# Patient Record
Sex: MALE | Race: WHITE | NOT HISPANIC OR LATINO
[De-identification: names, ages, dates, MRNs, and addresses within clinical notes are randomized per-mention and may not be internally consistent; named-entity substitution may affect disease eponyms.]

---
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
## 2022-01-03 ENCOUNTER — NON-APPOINTMENT (OUTPATIENT)
Age: 22
End: 2022-01-03

## 2022-01-20 ENCOUNTER — NON-APPOINTMENT (OUTPATIENT)
Age: 22
End: 2022-01-20

## 2022-01-24 NOTE — ASU DISCHARGE PLAN (ADULT/PEDIATRIC) - CALL YOUR DOCTOR IF YOU HAVE ANY OF THE FOLLOWING:
No Nausea and vomiting that does not stop/Inability to tolerate liquids or foods/Increased irritability or sluggishness

## 2022-04-05 DIAGNOSIS — Z20.822 CONTACT WITH AND (SUSPECTED) EXPOSURE TO COVID-19: ICD-10-CM

## 2022-04-11 LAB
COVID-19 SPIKE DOMAIN ANTIBODY INTERPRETATION: POSITIVE
DEPRECATED KAPPA LC FREE/LAMBDA SER: 1.35 RATIO
IGA SER QL IEP: 80 MG/DL
IGG SER QL IEP: 906 MG/DL
IGM SER QL IEP: 47 MG/DL
KAPPA LC CSF-MCNC: 0.68 MG/DL
KAPPA LC SERPL-MCNC: 0.92 MG/DL
SARS-COV-2 AB SERPL IA-ACNC: >250 U/ML

## 2022-04-28 ENCOUNTER — APPOINTMENT (OUTPATIENT)
Dept: ULTRASOUND IMAGING | Facility: HOSPITAL | Age: 22
End: 2022-04-28

## 2022-04-28 ENCOUNTER — OUTPATIENT (OUTPATIENT)
Dept: OUTPATIENT SERVICES | Facility: HOSPITAL | Age: 22
LOS: 1 days | End: 2022-04-28

## 2022-04-28 ENCOUNTER — RESULT REVIEW (OUTPATIENT)
Age: 22
End: 2022-04-28

## 2022-04-28 ENCOUNTER — APPOINTMENT (OUTPATIENT)
Dept: PEDIATRIC NEPHROLOGY | Facility: CLINIC | Age: 22
End: 2022-04-28
Payer: COMMERCIAL

## 2022-04-28 VITALS
HEIGHT: 60.24 IN | DIASTOLIC BLOOD PRESSURE: 79 MMHG | WEIGHT: 86.31 LBS | TEMPERATURE: 98.06 F | BODY MASS INDEX: 16.72 KG/M2 | HEART RATE: 84 BPM | SYSTOLIC BLOOD PRESSURE: 123 MMHG

## 2022-04-28 DIAGNOSIS — Z98.890 OTHER SPECIFIED POSTPROCEDURAL STATES: Chronic | ICD-10-CM

## 2022-04-28 DIAGNOSIS — Z92.89 PERSONAL HISTORY OF OTHER MEDICAL TREATMENT: Chronic | ICD-10-CM

## 2022-04-28 DIAGNOSIS — Z96.22 MYRINGOTOMY TUBE(S) STATUS: Chronic | ICD-10-CM

## 2022-04-28 DIAGNOSIS — Z93.1 GASTROSTOMY STATUS: Chronic | ICD-10-CM

## 2022-04-28 DIAGNOSIS — Z87.74 PERSONAL HISTORY OF (CORRECTED) CONGENITAL MALFORMATIONS OF HEART AND CIRCULATORY SYSTEM: Chronic | ICD-10-CM

## 2022-04-28 DIAGNOSIS — Z98.1 ARTHRODESIS STATUS: Chronic | ICD-10-CM

## 2022-04-28 DIAGNOSIS — N20.0 CALCULUS OF KIDNEY: ICD-10-CM

## 2022-04-28 DIAGNOSIS — Z87.438 PERSONAL HISTORY OF OTHER DISEASES OF MALE GENITAL ORGANS: Chronic | ICD-10-CM

## 2022-04-28 PROCEDURE — 99214 OFFICE O/P EST MOD 30 MIN: CPT | Mod: GC

## 2022-04-28 RX ORDER — LEVETIRACETAM 100 MG/ML
100 SOLUTION ORAL TWICE DAILY
Qty: 1200 | Refills: 0 | Status: ACTIVE | COMMUNITY
Start: 2021-04-20

## 2022-04-28 RX ORDER — GLYCOPYRROLATE 1 MG/5ML
1 LIQUID ORAL 3 TIMES DAILY
Qty: 900 | Refills: 0 | Status: ACTIVE | COMMUNITY
Start: 2018-01-25

## 2022-04-30 LAB
ANION GAP SERPL CALC-SCNC: 12 MMOL/L
BUN SERPL-MCNC: 19 MG/DL
CALCIUM ?TM UR-MCNC: 11.2 MG/DL
CALCIUM SERPL-MCNC: 9.8 MG/DL
CALCIUM/CREAT UR: 0.2 RATIO
CHLORIDE SERPL-SCNC: 97 MMOL/L
CHOLEST SERPL-MCNC: 139 MG/DL
CO2 SERPL-SCNC: 29 MMOL/L
CREAT SERPL-MCNC: 0.65 MG/DL
CREAT SPEC-SCNC: 68 MG/DL
EGFR: 139 ML/MIN/1.73M2
GLUCOSE SERPL-MCNC: 90 MG/DL
HDLC SERPL-MCNC: 38 MG/DL
LDLC SERPL CALC-MCNC: 86 MG/DL
NONHDLC SERPL-MCNC: 101 MG/DL
POTASSIUM SERPL-SCNC: 4.1 MMOL/L
SODIUM SERPL-SCNC: 138 MMOL/L
TRIGL SERPL-MCNC: 74 MG/DL
URATE SERPL-MCNC: 4.4 MG/DL

## 2022-05-03 NOTE — PHYSICAL EXAM
[Normal] : regular rate and variability; normal S1 and S2; no murmur [de-identified] : thin, wheelchair bound, well cared for [de-identified] : intact-exam limited [de-identified] : non-tender to palpation [de-identified] : muscular artophy [de-identified] : nonverbal

## 2022-05-03 NOTE — PHYSICAL EXAM
[Normal] : regular rate and variability; normal S1 and S2; no murmur [de-identified] : thin, wheelchair bound, well cared for [de-identified] : intact-exam limited [de-identified] : non-tender to palpation [de-identified] : muscular artophy [de-identified] : nonverbal

## 2022-05-04 ENCOUNTER — APPOINTMENT (OUTPATIENT)
Dept: PEDIATRIC ALLERGY IMMUNOLOGY | Facility: CLINIC | Age: 22
End: 2022-05-04
Payer: COMMERCIAL

## 2022-05-04 VITALS — DIASTOLIC BLOOD PRESSURE: 71 MMHG | SYSTOLIC BLOOD PRESSURE: 98 MMHG | TEMPERATURE: 206.96 F

## 2022-05-04 DIAGNOSIS — R76.8 OTHER SPECIFIED ABNORMAL IMMUNOLOGICAL FINDINGS IN SERUM: ICD-10-CM

## 2022-05-04 PROCEDURE — 99215 OFFICE O/P EST HI 40 MIN: CPT

## 2022-05-06 PROBLEM — R76.8 LOW IMMUNOGLOBULIN LEVEL: Status: ACTIVE | Noted: 2020-10-22

## 2022-05-06 NOTE — CONSULT LETTER
[Dear  ___] : Dear  [unfilled], [Courtesy Letter:] : I had the pleasure of seeing your patient, [unfilled], in my office today. [Please see my note below.] : Please see my note below. [Consult Closing:] : Thank you very much for allowing me to participate in the care of this patient.  If you have any questions, please do not hesitate to contact me. [Sincerely,] : Sincerely, [FreeTextEntry3] : Hank Green MD\par  for Academic Affairs, Department of Pediatrics\par Chief, Division of Allergy/Immunology\par [facility]\par \par Edi Ruiz Professor of Pediatrics, Professor of Molecular Medicine\par [employer] [facility]\par \par

## 2022-05-06 NOTE — REVIEW OF SYSTEMS
[Nl] : Endocrine [Received Influenza Vaccine this Past Year] : patient has received the Influenza vaccine this past year [Fatigue] : no fatigue [Fever] : no fever [Wgt Loss (___ Lbs)] : no recent weight loss [FreeTextEntry8] : see HPI [de-identified] : multiple kidney stones, s/p removal of most of his R kidney stones. developed new ones in both kidneys [de-identified] : hypogammaglobulinemia [Immunizations are up to date] : Immunizations are not up to date

## 2022-05-06 NOTE — PHYSICAL EXAM
[Alert] : alert [Well Nourished] : well nourished [Healthy Appearance] : healthy appearance [No Acute Distress] : no acute distress [Well Developed] : well developed [Normal Pupil & Iris Size/Symmetry] : normal pupil and iris size and symmetry [No Discharge] : no discharge [No Photophobia] : no photophobia [Sclera Not Icteric] : sclera not icteric [Normal TMs] : both tympanic membranes were normal [Normal Nasal Mucosa] : the nasal mucosa was normal [Normal Lips/Tongue] : the lips and tongue were normal [Normal Outer Ear/Nose] : the ears and nose were normal in appearance [Normal Tonsils] : normal tonsils [No Thrush] : no thrush [Supple] : the neck was supple [Normal Rate and Effort] : normal respiratory rhythm and effort [No Crackles] : no crackles [No Retractions] : no retractions [Bilateral Audible Breath Sounds] : bilateral audible breath sounds [Normal Rate] : heart rate was normal  [Normal S1, S2] : normal S1 and S2 [No murmur] : no murmur [Regular Rhythm] : with a regular rhythm [Soft] : abdomen soft [Not Tender] : non-tender [Not Distended] : not distended [No HSM] : no hepato-splenomegaly [Normal Cervical Lymph Nodes] : cervical [Skin Intact] : skin intact  [No Rash] : no rash [No Skin Lesions] : no skin lesions [No clubbing] : no clubbing [No Edema] : no edema [No Cyanosis] : no cyanosis [Pale mucosa] : no pale mucosa [No Motor Deficits] : the motor exam was normal [Normal Mood] : mood was normal [de-identified] : non verbal, cognitively impaired in wheelchair [de-identified] : n

## 2022-05-06 NOTE — HISTORY OF PRESENT ILLNESS
[Home] : at home, [unfilled] , at the time of the visit. [Medical Office: ([facility])___] : at the medical office located in  [Father] : father [de-identified] : NUNO PEPPER is a 21 year old male with a history of KAT6A mutation (complicated with seizure disorder (on AED),  hypogammaglobulinemia (on Gammagard 20g IV every 4 weeks), specific antibody deficiency, cognitive development delay, ASD (s/p repair), s/p Nissen fundoplication, s/p PEG. He was last seen on 3/25/2021. \par \par Interim history: Pt has been stable with resolution of spinal instability with a fusion of C2-C3, R kidney stones 6/7 were pulverized and removed via surgery. 1 was left as not visualized. He has developed many small stones in both kidneys. Pt is followed by nephrology. Pt switched to Diuril now  Pt without any infections and has been vaccinated to COVID and has protective anti-spike antibodies. Pt is back in school wearing a mask and lately, without a mask.\par \par Pat history:  Pt had 2nd COVID-19 spike protein blood test and parents wanted to know if Jose Carlos made a robust response to the vaccine. Pt has been well at home without infections since the last visit.\par \par Past History: Patient had nephrolithotomy of R kidney for chronic nephrolithiasis on 6/18/21. Stone analysis showed calcium phosphate. Post-op, he developed pyelonephritis; he was hospitalized for 4 days and improved with IV antibiotic therapy. They report that they were able to remove 6 of 7 visualized stones in his right kidney. Tentative plan to monitor the remaining stone closely. Besides this infection that developed post-op, he has been infection free since his last visit. There are tentative plans to stop Keflex to see if he remains infection free. He is currently on Keflex antibiotic prophylaxis and following closely with Urology and Nephro. He is continuing with Gammagard 20g IV every 4 weeks. Recent Ig levels from 7/8/21 showed IgG 642, which trended down from 823 in 4/2021.\par \par Past history (3/25/2021):\par Since he was last seen, he has started Gammagard 20g IV every 4 weeks, which he is tolerating. He has also started antibiotic prophylaxis with Keflex in 1/2021. They report that he was also transitioned from Trileptal to Keppra since 1/2021. During the transition period between anti-epileptic treatments, he did have 2 seizures. He also had recent cervical spinal fusion surgery in 2/2021. Urology repeated CT abdo/pelvis which showed "6 stones in the right kidney, increased in number when compared to old CT from 2006" with tentative plan for surgical extraction. Otherwise Nuno has been doing well.\par \par (11/5/2020):\par  Pt is unchanged from last visit. review of lab work from Nuno' last clinic visit shows low IgG and IgM levels and multiple specific antibody deficiencies despite being up to date with his immunizations. Parents called for lab results, differential diagnosis, and disposition. \par \par (10/28/2020):  \par Pt. follows with Yasmin Amos,  at [place]) complicated with seizure disorder (on Trileptal), cognitive development delay (motor function intact), ASD (s/p repair), s/p hernia repairs, s/p Nissen fundoplication, s/p PEG who presents for evaluation of immunodeficiency. He was referred by Dr. Mcneil. Patient was found to have low IgG level on routine lab screening for seizure disorder. \par \par Pt’s dad denies chronic history of infections, but over the last few months he has had recurrent UTIs. Starting in 8/2020, he reports having 4 UTIs and each was treated with courses of antibiotics. His urine culture grew E coli. Dad reports foul smelling urine associated with UTIs, but denies flank pain, fevers, penile discharge or hematuria. Dad reports a history of nephrolithiasis several years ago which resolved spontaneously. He has had kidney imaging and VCUG reflux study which were unremarkable. He does not have an indwelling catheter. He wears a diaper. He had no history of UTIs prior to 8/2020.\par \par Prior to the past 3 months, he usually had 0-1 infections each year. There is no history of sinopulmonary infections, pharyngitis, otitis, GI infections, cellulitis or fevers.  Prior to this year he would require 1-2 courses of antibiotics, usually for a respiratory infection, but dad feels there may have been an abundance of caution given patient’s comorbid conditions. There is no history of bacteremia or pneumonia. He denies any family history of immunodeficiency, miscarriages or early infant deaths. He reports being up to date with all recommended age-appropriate immunizations.\par \par The patient denies any history of anaphylaxis, angioedema, asthma/respiratory manifestations, recurrent hives/pruritic skin. There is no history of environmental allergies. [FreeTextEntry3] : father [FreeTextEntry4] : Dr. Ben Rand

## 2022-06-27 ENCOUNTER — NON-APPOINTMENT (OUTPATIENT)
Age: 22
End: 2022-06-27

## 2022-07-05 LAB
DEPRECATED KAPPA LC FREE/LAMBDA SER: 1.17 RATIO
IGA SER QL IEP: 64 MG/DL
IGG SER QL IEP: 721 MG/DL
IGM SER QL IEP: 36 MG/DL
KAPPA LC CSF-MCNC: 0.96 MG/DL
KAPPA LC SERPL-MCNC: 1.12 MG/DL

## 2022-07-08 ENCOUNTER — APPOINTMENT (OUTPATIENT)
Dept: UROLOGY | Facility: CLINIC | Age: 22
End: 2022-07-08

## 2022-07-08 DIAGNOSIS — K59.09 OTHER CONSTIPATION: ICD-10-CM

## 2022-07-08 DIAGNOSIS — N39.0 URINARY TRACT INFECTION, SITE NOT SPECIFIED: ICD-10-CM

## 2022-07-08 PROCEDURE — 99213 OFFICE O/P EST LOW 20 MIN: CPT

## 2022-07-08 NOTE — PHYSICAL EXAM
[General Appearance - Well Developed] : well developed [General Appearance - Well Nourished] : well nourished [Normal Appearance] : normal appearance [Well Groomed] : well groomed [General Appearance - In No Acute Distress] : no acute distress [Abdomen Soft] : soft [Abdomen Tenderness] : non-tender [Skin Color & Pigmentation] : normal skin color and pigmentation [] : no respiratory distress

## 2022-07-08 NOTE — ASSESSMENT
[FreeTextEntry1] : Continue monitoring stones for now\par No hydronephrosis present despite increased stone burden\par \par May need bilateral ureteroscopy depending on next ultrasound findings\par Continue to follow stone prevention regimen by Dr. Negron\par \par Telehealth visit in November 2022 after he completes the next ultrasound

## 2022-07-08 NOTE — HISTORY OF PRESENT ILLNESS
[FreeTextEntry1] : s/p RIGHT PCNL 6/18/21\par stones =  80% Calcium phosphate (apatite), 20% Calcium phosphate (brushite) \par POD#1 CT scan shows one small residual stone right lower pole\par \par \par Patient is here for follow-up with his mother and accompanied also by his sister\par \par Most recent renal ultrasound images and report April 2022 reviewed and compared to postop CT scan: Increased stone burden in the right kidney with ultrasound showing right kidney stones in mid to upper calyces, mid calyces and lower pole.  Left kidney stone burden appears stable.  No hydronephrosis is seen bilaterally\par \par No recent urinary tract infections

## 2022-07-14 ENCOUNTER — APPOINTMENT (OUTPATIENT)
Dept: PEDIATRIC ALLERGY IMMUNOLOGY | Facility: CLINIC | Age: 22
End: 2022-07-14

## 2022-07-18 ENCOUNTER — APPOINTMENT (OUTPATIENT)
Dept: PEDIATRIC ALLERGY IMMUNOLOGY | Facility: CLINIC | Age: 22
End: 2022-07-18

## 2022-07-18 DIAGNOSIS — G40.909 EPILEPSY, UNSPECIFIED, NOT INTRACTABLE, W/OUT STATUS EPILEPTICUS: ICD-10-CM

## 2022-07-18 DIAGNOSIS — R76.8 OTHER SPECIFIED ABNORMAL IMMUNOLOGICAL FINDINGS IN SERUM: ICD-10-CM

## 2022-07-18 PROCEDURE — 99443: CPT

## 2022-07-19 NOTE — HISTORY OF PRESENT ILLNESS
[Home] : at home, [unfilled] , at the time of the visit. [Medical Office: ([facility])___] : at the medical office located in  [Father] : father [de-identified] : NUNO PEPPER is a 21 year old male with a history of KAT6A mutation (complicated with seizure disorder (on AED),  hypogammaglobulinemia (on Gammagard 20g IV every 4 weeks), specific antibody deficiency, cognitive development delay, ASD (s/p repair), s/p Nissen fundoplication, s/p PEG. He was last seen on 3/25/2021. \par \par Interim history: Mom wants to know about her son's IVIG infusion. His IgG level on 7/1/22 was 721 mg/dL, down from 926 mg/dLon 4/8/22. These were both drawn just before his IVIG infusion.  Pt is still taking 20 gms IV every 4 wks.  Pt is others clinically well except for the development of 4 new stones and a large one in the R kidney. Pt also has small stones in the L kidney.  Pt's Diuril was increased to 8 mgs every day.  \par \par Past History: Pt has been stable with resolution of spinal instability with a fusion of C2-C3, R kidney stones 6/7 were pulverized and removed via surgery. 1 was left as not visualized. He has developed many small stones in both kidneys. Pt is followed by nephrology. Pt switched to Diuril now  Pt without any infections and has been vaccinated to COVID and has protective anti-spike antibodies. Pt is back in school wearing a mask and lately, without a mask.\par \par Pt had 2nd COVID-19 spike protein blood test and parents wanted to know if Jose Carlos made a robust response to the vaccine. Pt has been well at home without infections since the last visit.\par \par Patient had nephrolithotomy of R kidney for chronic nephrolithiasis on 6/18/21. Stone analysis showed calcium phosphate. Post-op, he developed pyelonephritis; he was hospitalized for 4 days and improved with IV antibiotic therapy. They report that they were able to remove 6 of 7 visualized stones in his right kidney. Tentative plan to monitor the remaining stone closely. Besides this infection that developed post-op, he has been infection free since his last visit. There are tentative plans to stop Keflex to see if he remains infection free. He is currently on Keflex antibiotic prophylaxis and following closely with Urology and Nephro. He is continuing with Gammagard 20g IV every 4 weeks. Recent Ig levels from 7/8/21 showed IgG 642, which trended down from 823 in 4/2021.\par \par Past history (3/25/2021):\par Since he was last seen, he has started Gammagard 20g IV every 4 weeks, which he is tolerating. He has also started antibiotic prophylaxis with Keflex in 1/2021. They report that he was also transitioned from Trileptal to Keppra since 1/2021. During the transition period between anti-epileptic treatments, he did have 2 seizures. He also had recent cervical spinal fusion surgery in 2/2021. Urology repeated CT abdo/pelvis which showed "6 stones in the right kidney, increased in number when compared to old CT from 2006" with tentative plan for surgical extraction. Otherwise Nuno has been doing well.\par \par (11/5/2020):\par  Pt is unchanged from last visit. review of lab work from Nuno' last clinic visit shows low IgG and IgM levels and multiple specific antibody deficiencies despite being up to date with his immunizations. Parents called for lab results, differential diagnosis, and disposition. \par \par (10/28/2020):  \par Pt. follows with Yasmin Amos,  at [place]) complicated with seizure disorder (on Trileptal), cognitive development delay (motor function intact), ASD (s/p repair), s/p hernia repairs, s/p Nissen fundoplication, s/p PEG who presents for evaluation of immunodeficiency. He was referred by Dr. Mcneil. Patient was found to have low IgG level on routine lab screening for seizure disorder. \par \par Pt’s dad denies chronic history of infections, but over the last few months he has had recurrent UTIs. Starting in 8/2020, he reports having 4 UTIs and each was treated with courses of antibiotics. His urine culture grew E coli. Dad reports foul smelling urine associated with UTIs, but denies flank pain, fevers, penile discharge or hematuria. Dad reports a history of nephrolithiasis several years ago which resolved spontaneously. He has had kidney imaging and VCUG reflux study which were unremarkable. He does not have an indwelling catheter. He wears a diaper. He had no history of UTIs prior to 8/2020.\par \par Prior to the past 3 months, he usually had 0-1 infections each year. There is no history of sinopulmonary infections, pharyngitis, otitis, GI infections, cellulitis or fevers.  Prior to this year he would require 1-2 courses of antibiotics, usually for a respiratory infection, but dad feels there may have been an abundance of caution given patient’s comorbid conditions. There is no history of bacteremia or pneumonia. He denies any family history of immunodeficiency, miscarriages or early infant deaths. He reports being up to date with all recommended age-appropriate immunizations.\par \par The patient denies any history of anaphylaxis, angioedema, asthma/respiratory manifestations, recurrent hives/pruritic skin. There is no history of environmental allergies. [FreeTextEntry3] : father [FreeTextEntry4] : Dr. Ben Rand

## 2022-07-19 NOTE — CONSULT LETTER
[Dear  ___] : Dear  [unfilled], [Courtesy Letter:] : I had the pleasure of seeing your patient, [unfilled], in my office today. [Please see my note below.] : Please see my note below. [Consult Closing:] : Thank you very much for allowing me to participate in the care of this patient.  If you have any questions, please do not hesitate to contact me. [Sincerely,] : Sincerely, [FreeTextEntry3] : Hank Green MD\par  for Academic Affairs, Department of Pediatrics\par Chief, Division of Allergy/Immunology\par [facility]\par \par Edi Ruiz Professor of Pediatrics, Professor of Molecular Medicine\par [employer] [facility]\par \par

## 2022-07-19 NOTE — REVIEW OF SYSTEMS
[Nl] : Endocrine [Received Influenza Vaccine this Past Year] : patient has received the Influenza vaccine this past year [Fatigue] : no fatigue [Fever] : no fever [Wgt Loss (___ Lbs)] : no recent weight loss [Dec Urine Output] : no oliguria [FreeTextEntry8] : see HPI [de-identified] : multiple kidney stones, s/p removal of most of his R kidney stones. developed new ones in both kidneys [de-identified] : hypogammaglobulinemia [Immunizations are up to date] : Immunizations are not up to date

## 2022-07-21 ENCOUNTER — NON-APPOINTMENT (OUTPATIENT)
Age: 22
End: 2022-07-21

## 2022-08-12 ENCOUNTER — NON-APPOINTMENT (OUTPATIENT)
Age: 22
End: 2022-08-12

## 2022-08-25 LAB
DEPRECATED KAPPA LC FREE/LAMBDA SER: 1.28 RATIO
IGA SER QL IEP: 78 MG/DL
IGG SER QL IEP: 717 MG/DL
IGM SER QL IEP: 38 MG/DL
KAPPA LC CSF-MCNC: 1.09 MG/DL
KAPPA LC SERPL-MCNC: 1.39 MG/DL

## 2022-09-19 ENCOUNTER — TRANSCRIPTION ENCOUNTER (OUTPATIENT)
Age: 22
End: 2022-09-19

## 2022-09-19 ENCOUNTER — NON-APPOINTMENT (OUTPATIENT)
Age: 22
End: 2022-09-19

## 2022-09-20 ENCOUNTER — APPOINTMENT (OUTPATIENT)
Dept: DISASTER EMERGENCY | Facility: HOSPITAL | Age: 22
End: 2022-09-20

## 2022-09-20 ENCOUNTER — OUTPATIENT (OUTPATIENT)
Dept: OUTPATIENT SERVICES | Facility: HOSPITAL | Age: 22
LOS: 1 days | End: 2022-09-20

## 2022-09-20 VITALS
TEMPERATURE: 98 F | HEIGHT: 61 IN | SYSTOLIC BLOOD PRESSURE: 99 MMHG | WEIGHT: 91.05 LBS | HEART RATE: 87 BPM | RESPIRATION RATE: 18 BRPM | DIASTOLIC BLOOD PRESSURE: 67 MMHG | OXYGEN SATURATION: 99 %

## 2022-09-20 VITALS
SYSTOLIC BLOOD PRESSURE: 96 MMHG | OXYGEN SATURATION: 96 % | HEART RATE: 78 BPM | RESPIRATION RATE: 18 BRPM | DIASTOLIC BLOOD PRESSURE: 64 MMHG | TEMPERATURE: 98 F

## 2022-09-20 DIAGNOSIS — U07.1 COVID-19: ICD-10-CM

## 2022-09-20 DIAGNOSIS — Z98.1 ARTHRODESIS STATUS: Chronic | ICD-10-CM

## 2022-09-20 DIAGNOSIS — Z96.22 MYRINGOTOMY TUBE(S) STATUS: Chronic | ICD-10-CM

## 2022-09-20 DIAGNOSIS — Z87.438 PERSONAL HISTORY OF OTHER DISEASES OF MALE GENITAL ORGANS: Chronic | ICD-10-CM

## 2022-09-20 DIAGNOSIS — Z93.1 GASTROSTOMY STATUS: Chronic | ICD-10-CM

## 2022-09-20 DIAGNOSIS — Z92.89 PERSONAL HISTORY OF OTHER MEDICAL TREATMENT: Chronic | ICD-10-CM

## 2022-09-20 DIAGNOSIS — Z87.74 PERSONAL HISTORY OF (CORRECTED) CONGENITAL MALFORMATIONS OF HEART AND CIRCULATORY SYSTEM: Chronic | ICD-10-CM

## 2022-09-20 DIAGNOSIS — Z98.890 OTHER SPECIFIED POSTPROCEDURAL STATES: Chronic | ICD-10-CM

## 2022-09-20 RX ORDER — BEBTELOVIMAB 87.5 MG/ML
175 INJECTION, SOLUTION INTRAVENOUS ONCE
Refills: 0 | Status: COMPLETED | OUTPATIENT
Start: 2022-09-20 | End: 2022-09-20

## 2022-09-20 RX ADMIN — BEBTELOVIMAB 175 MILLIGRAM(S): 87.5 INJECTION, SOLUTION INTRAVENOUS at 08:22

## 2022-09-20 NOTE — CHART NOTE - NSCHARTNOTEFT_GEN_A_CORE
CC: Monoclonal Antibody Infusion/COVID 19 Positive on 9/19/22  21y Male accompanied  by Father  with recent dx of COVID 19+ who presents today for elective Bebtelovimab. Patient has been screened and was deemed to be a candidate.    Symptoms/ Criteria  Date of Symptom Onset: 9/19/22  Symptoms:   Date of Positive COVID PCR: 9/19/22  Risk Profile includes:   Kat6A Syndrome,  global developmental delay,  Slowed gastric motility ( gastrostomy tube) seizures on Keppra, immunosuppressive disorder receives gamma eron monthly ( immunologist sent pt here for MAB)    Vaccination Status: Pfizer with Booster x1     PMHx:  FH: hypertension (Father)    Infection due to severe acute respiratory syndrome coronavirus 2 (SARS-CoV-2)    ASD (atrial septal defect)    Global developmental delay    Hypotonia    Status post Nissen fundoplication (with gastrostomy tube placement)    Seizure disorder    H/O sleep disturbance    History of genetic disorder    Small stature    Myopic astigmatism of both eyes    Low immunoglobulin level    Sialorrhea    Spondylolisthesis    H/O hernia repair    S/P tube myringotomy    H/O congenital atrial septal defect (ASD) repair    S/P Nissen fundoplication (with gastrostomy tube placement)    H/O undescended testicle    History of MRI    S/P cervical spinal fusion        Exam/findings:  T(F): 98.1  HR: 88  BP:99/67  RR: 20  SpO2: 97%    PE:   Appearance: NAD	  HEENT:  NC/AT  Cardiovascular:  No edema  Respiratory: no use of accessory muscles  Gastrointestinal:  non-distended   Skin: warm and dry  Neurologic: Non-focal, non-verbal  Extremities: Sitting in wheelchair     ASSESSMENT:  Pt is COVID positive with mild to moderate symptoms who was referred for elective MAB (Bebtelovimab).  Pt severely developmentally disabled-unable to sign consent-Father with pt and HCP-signed consent-mother on phone as well for informed consent explanation    PLAN:  - MAB treatment explained to patient. I have reviewed the Bebtelovimab Emergency Use Authorization (EUA) and I have provided the patient or patient's caregiver with the following information:   1. FDA has authorized emergency use of Bebtelovimab to be administered for the treatment of mild to moderate COVID-19, which is not an FDA-approved biological product.   2. The patient or patient's caregiver has the option to accept or refuse administration of MAB.   3. The significant known and potential risks and benefits of Bebtelovimab and the extent to which such risks and benefits are unknown.  4. Information on available alternative treatments and risks and benefits of those alternatives.  - Patient verbalized understanding of plan and agrees to treatment. All questions answered.  - Consent for MAB obtained.   - 175mg of Bebtelovimab administered as a single intravenous injection over at least 30 seconds.   - Observe patient for one hour post medication administration and then if stable, discharge home with oupatient follow up as planned by PCP.      POST ASSESSMENT:   Patient completed MAB, and monitored x 1 hour post-infusion with no adverse reactions noted, remained hemodynamically stable.  - Patient tolerated infusion well; denies complaints of chest pain/SOB/dizziness/palpitations.   - VSS for discharge home.  - D/C instructions given/ fact sheet included.  - Patient was instructed to self-isolate and use infection control measures (e.g wear mask, isolate, social distance, avoid sharing personal items, clean and disinfect "high touch" surfaces, and frequent handwashing according to the CDC guidelines.   - The patient was informed on what symptoms to be aware of for the next couple of days, and if there are any issues to call the 24/7 clinical call center. Patient was instructed to follow up with primary care provider as needed.  -Discharge 1 hour post-injection CC: Monoclonal Antibody Infusion/COVID 19 Positive on 9/19/22  21y Male accompanied  by Father  with recent dx of COVID 19+ who presents today for elective Bebtelovimab. Patient has been screened and was deemed to be a candidate.    Symptoms/ Criteria  Date of Symptom Onset: 9/19/22  Symptoms:   Date of Positive COVID PCR: 9/19/22  Risk Profile includes:   Kat6A Syndrome,  global developmental delay,  Slowed gastric motility ( gastrostomy tube) seizures on Keppra, immunosuppressive disorder receives gamma eron monthly ( immunologist sent pt here for MAB)    Vaccination Status: Pfizer with Booster x1     PMHx:  FH: hypertension (Father)    Infection due to severe acute respiratory syndrome coronavirus 2 (SARS-CoV-2)    ASD (atrial septal defect)    Global developmental delay    Hypotonia    Status post Nissen fundoplication (with gastrostomy tube placement)    Seizure disorder    H/O sleep disturbance    History of genetic disorder    Small stature    Myopic astigmatism of both eyes    Low immunoglobulin level    Sialorrhea    Spondylolisthesis    H/O hernia repair    S/P tube myringotomy    H/O congenital atrial septal defect (ASD) repair    S/P Nissen fundoplication (with gastrostomy tube placement)    H/O undescended testicle    History of MRI    S/P cervical spinal fusion        Exam/findings:  T(F): 98.1  HR: 88  BP:99/67  RR: 20  SpO2: 97%    PE:   Appearance: NAD	  HEENT:  NC/AT  Cardiovascular:  No edema  Respiratory: no use of accessory muscles  Gastrointestinal:  non-distended   Skin: warm and dry  Neurologic: Non-focal, non-verbal  Extremities: Sitting in wheelchair     ASSESSMENT:  Pt is COVID positive with mild to moderate symptoms who was referred for elective MAB (Bebtelovimab).  Pt severely developmentally disabled-unable to sign consent-Father with pt and HCP-signed consent-mother on phone as well for informed consent explanation  Pt is 91 pounds, and age 21-meets the criteria to receive MAB ( Bebtolivimab).  PLAN:  - MAB treatment explained to patient. I have reviewed the Bebtelovimab Emergency Use Authorization (EUA) and I have provided the patient or patient's caregiver with the following information:   1. FDA has authorized emergency use of Bebtelovimab to be administered for the treatment of mild to moderate COVID-19, which is not an FDA-approved biological product.   2. The patient or patient's caregiver has the option to accept or refuse administration of MAB.   3. The significant known and potential risks and benefits of Bebtelovimab and the extent to which such risks and benefits are unknown.  4. Information on available alternative treatments and risks and benefits of those alternatives.  - Patient verbalized understanding of plan and agrees to treatment. All questions answered.  - Consent for MAB obtained.   - 175mg of Bebtelovimab administered as a single intravenous injection over at least 30 seconds.   - Observe patient for one hour post medication administration and then if stable, discharge home with oupatient follow up as planned by PCP.      POST ASSESSMENT:   Patient completed MAB, and monitored x 1 hour post-infusion with no adverse reactions noted, remained hemodynamically stable.  - Patient tolerated infusion well; denies complaints of chest pain/SOB/dizziness/palpitations.   - VSS for discharge home.  - D/C instructions given/ fact sheet included.  - Patient was instructed to self-isolate and use infection control measures (e.g wear mask, isolate, social distance, avoid sharing personal items, clean and disinfect "high touch" surfaces, and frequent handwashing according to the CDC guidelines.   - The patient was informed on what symptoms to be aware of for the next couple of days, and if there are any issues to call the 24/7 clinical call center. Patient was instructed to follow up with primary care provider as needed.  -Discharge 1 hour post-injection CC: Monoclonal Antibody Infusion/COVID 19 Positive on 9/19/22  21y Male accompanied  by Father  with recent dx of COVID 19+ who presents today for elective Bebtelovimab. Patient has been screened and was deemed to be a candidate.    Symptoms/ Criteria  Date of Symptom Onset: 9/19/22  Symptoms: fever cough congestion   Date of Positive COVID PCR: 9/19/22  Risk Profile includes:   Kat6A Syndrome,  global developmental delay,  Slowed gastric motility ( gastrostomy tube) seizures on Keppra, immunosuppressive disorder receives gamma eron monthly ( immunologist sent pt here for MAB)    Vaccination Status: Pfizer with Booster x1     PMHx:  FH: hypertension (Father)    Infection due to severe acute respiratory syndrome coronavirus 2 (SARS-CoV-2)    ASD (atrial septal defect)    Global developmental delay    Hypotonia    Status post Nissen fundoplication (with gastrostomy tube placement)    Seizure disorder    H/O sleep disturbance    History of genetic disorder    Small stature    Myopic astigmatism of both eyes    Low immunoglobulin level    Sialorrhea    Spondylolisthesis    H/O hernia repair    S/P tube myringotomy    H/O congenital atrial septal defect (ASD) repair    S/P Nissen fundoplication (with gastrostomy tube placement)    H/O undescended testicle    History of MRI    S/P cervical spinal fusion        Exam/findings:  T(F): 98.1  HR: 88  BP:99/67  RR: 20  SpO2: 97%    PE:   Appearance: NAD	  HEENT:  NC/AT  Cardiovascular:  No edema  Respiratory: no use of accessory muscles  Gastrointestinal:  non-distended   Skin: warm and dry  Neurologic: Non-focal, non-verbal  Extremities: Sitting in wheelchair     ASSESSMENT:  Pt is COVID positive with mild to moderate symptoms who was referred for elective MAB (Bebtelovimab).  Pt severely developmentally disabled-unable to sign consent-Father with pt and HCP-signed consent-mother on phone as well for informed consent explanation  Pt is 91 pounds, and age 21-meets the criteria to receive MAB ( Bebtolivimab).  PLAN:  - MAB treatment explained to patient. I have reviewed the Bebtelovimab Emergency Use Authorization (EUA) and I have provided the patient or patient's caregiver with the following information:   1. FDA has authorized emergency use of Bebtelovimab to be administered for the treatment of mild to moderate COVID-19, which is not an FDA-approved biological product.   2. The patient or patient's caregiver has the option to accept or refuse administration of MAB.   3. The significant known and potential risks and benefits of Bebtelovimab and the extent to which such risks and benefits are unknown.  4. Information on available alternative treatments and risks and benefits of those alternatives.  - Patient verbalized understanding of plan and agrees to treatment. All questions answered.  - Consent for MAB obtained.   - 175mg of Bebtelovimab administered as a single intravenous injection over at least 30 seconds.   - Observe patient for one hour post medication administration and then if stable, discharge home with oupatient follow up as planned by PCP.      POST ASSESSMENT:   Patient completed MAB, and monitored x 1 hour post-infusion with no adverse reactions noted, remained hemodynamically stable.  - Patient tolerated infusion well; denies complaints of chest pain/SOB/dizziness/palpitations.   - VSS for discharge home.  - D/C instructions given/ fact sheet included.  - Patient was instructed to self-isolate and use infection control measures (e.g wear mask, isolate, social distance, avoid sharing personal items, clean and disinfect "high touch" surfaces, and frequent handwashing according to the CDC guidelines.   - The patient was informed on what symptoms to be aware of for the next couple of days, and if there are any issues to call the 24/7 clinical call center. Patient was instructed to follow up with primary care provider as needed.  -Discharge 1 hour post-injection

## 2022-09-21 ENCOUNTER — TRANSCRIPTION ENCOUNTER (OUTPATIENT)
Age: 22
End: 2022-09-21

## 2022-10-07 NOTE — PATIENT PROFILE PEDIATRIC. - NS MD HP INPLANTS MED DEV
-- DO NOT REPLY / DO NOT REPLY ALL --  -- Message is from Engagement Center Operations (EC[facility]) --    General Patient Message The patient mother would like a call regarding the referral for behavior. She would like to know who the patient she be schedule with. She would like codes to see if she can find a location closer to home        Alternative phone number:     Can a detailed message be left? Yes    Message Turnaround: PEDS SPECIALTY:    Please give this turnaround time to the caller:   \"This message will be sent to [state Provider's name]. The clinical team will fulfill your request as soon as they review your message.\"               Feeding tube

## 2022-11-01 ENCOUNTER — RESULT REVIEW (OUTPATIENT)
Age: 22
End: 2022-11-01

## 2022-11-01 ENCOUNTER — APPOINTMENT (OUTPATIENT)
Dept: ULTRASOUND IMAGING | Facility: HOSPITAL | Age: 22
End: 2022-11-01

## 2022-11-01 ENCOUNTER — APPOINTMENT (OUTPATIENT)
Dept: PEDIATRIC NEPHROLOGY | Facility: CLINIC | Age: 22
End: 2022-11-01

## 2022-11-01 ENCOUNTER — OUTPATIENT (OUTPATIENT)
Dept: OUTPATIENT SERVICES | Facility: HOSPITAL | Age: 22
LOS: 1 days | End: 2022-11-01

## 2022-11-01 VITALS
TEMPERATURE: 97.88 F | DIASTOLIC BLOOD PRESSURE: 64 MMHG | HEART RATE: 73 BPM | SYSTOLIC BLOOD PRESSURE: 100 MMHG | WEIGHT: 87 LBS

## 2022-11-01 DIAGNOSIS — Z87.438 PERSONAL HISTORY OF OTHER DISEASES OF MALE GENITAL ORGANS: Chronic | ICD-10-CM

## 2022-11-01 DIAGNOSIS — Z96.22 MYRINGOTOMY TUBE(S) STATUS: Chronic | ICD-10-CM

## 2022-11-01 DIAGNOSIS — Z87.74 PERSONAL HISTORY OF (CORRECTED) CONGENITAL MALFORMATIONS OF HEART AND CIRCULATORY SYSTEM: Chronic | ICD-10-CM

## 2022-11-01 DIAGNOSIS — Z98.1 ARTHRODESIS STATUS: Chronic | ICD-10-CM

## 2022-11-01 DIAGNOSIS — Z92.89 PERSONAL HISTORY OF OTHER MEDICAL TREATMENT: Chronic | ICD-10-CM

## 2022-11-01 DIAGNOSIS — Z93.1 GASTROSTOMY STATUS: Chronic | ICD-10-CM

## 2022-11-01 DIAGNOSIS — Z98.890 OTHER SPECIFIED POSTPROCEDURAL STATES: Chronic | ICD-10-CM

## 2022-11-01 DIAGNOSIS — N20.0 CALCULUS OF KIDNEY: ICD-10-CM

## 2022-11-01 PROCEDURE — 76770 US EXAM ABDO BACK WALL COMP: CPT | Mod: 26

## 2022-11-01 PROCEDURE — 99214 OFFICE O/P EST MOD 30 MIN: CPT

## 2022-11-02 ENCOUNTER — NON-APPOINTMENT (OUTPATIENT)
Age: 22
End: 2022-11-02

## 2022-11-02 LAB
25(OH)D3 SERPL-MCNC: 35.5 NG/ML
ALBUMIN SERPL ELPH-MCNC: 4.5 G/DL
ALP BLD-CCNC: 117 U/L
ALT SERPL-CCNC: 24 U/L
ANION GAP SERPL CALC-SCNC: 15 MMOL/L
AST SERPL-CCNC: 29 U/L
BASOPHILS # BLD AUTO: 0.03 K/UL
BASOPHILS NFR BLD AUTO: 0.6 %
BILIRUB SERPL-MCNC: 0.2 MG/DL
BUN SERPL-MCNC: 22 MG/DL
CALCIUM SERPL-MCNC: 9.8 MG/DL
CALCIUM SERPL-MCNC: 9.8 MG/DL
CHLORIDE SERPL-SCNC: 98 MMOL/L
CO2 SERPL-SCNC: 28 MMOL/L
CREAT SERPL-MCNC: 0.61 MG/DL
EGFR: 141 ML/MIN/1.73M2
EOSINOPHIL # BLD AUTO: 0.29 K/UL
EOSINOPHIL NFR BLD AUTO: 5.6 %
GLUCOSE SERPL-MCNC: 76 MG/DL
HCT VFR BLD CALC: 43.5 %
HGB BLD-MCNC: 14.4 G/DL
IMM GRANULOCYTES NFR BLD AUTO: 0.4 %
LYMPHOCYTES # BLD AUTO: 1.81 K/UL
LYMPHOCYTES NFR BLD AUTO: 34.9 %
MAN DIFF?: NORMAL
MCHC RBC-ENTMCNC: 32.4 PG
MCHC RBC-ENTMCNC: 33.1 GM/DL
MCV RBC AUTO: 98 FL
MONOCYTES # BLD AUTO: 0.47 K/UL
MONOCYTES NFR BLD AUTO: 9.1 %
NEUTROPHILS # BLD AUTO: 2.56 K/UL
NEUTROPHILS NFR BLD AUTO: 49.4 %
PARATHYROID HORMONE INTACT: 20 PG/ML
PHOSPHATE SERPL-MCNC: 3.3 MG/DL
PLATELET # BLD AUTO: 276 K/UL
POTASSIUM SERPL-SCNC: 3.8 MMOL/L
PROT SERPL-MCNC: 7.6 G/DL
RBC # BLD: 4.44 M/UL
RBC # FLD: 12.8 %
SODIUM SERPL-SCNC: 141 MMOL/L
URATE SERPL-MCNC: 3.3 MG/DL
WBC # FLD AUTO: 5.18 K/UL

## 2022-11-16 ENCOUNTER — APPOINTMENT (OUTPATIENT)
Dept: PEDIATRIC UROLOGY | Facility: CLINIC | Age: 22
End: 2022-11-16

## 2022-11-17 ENCOUNTER — NON-APPOINTMENT (OUTPATIENT)
Age: 22
End: 2022-11-17

## 2022-11-28 ENCOUNTER — OUTPATIENT (OUTPATIENT)
Dept: OUTPATIENT SERVICES | Facility: HOSPITAL | Age: 22
LOS: 1 days | End: 2022-11-28

## 2022-11-28 ENCOUNTER — APPOINTMENT (OUTPATIENT)
Dept: ULTRASOUND IMAGING | Facility: HOSPITAL | Age: 22
End: 2022-11-28

## 2022-11-28 DIAGNOSIS — N20.0 CALCULUS OF KIDNEY: ICD-10-CM

## 2022-11-28 DIAGNOSIS — Z87.74 PERSONAL HISTORY OF (CORRECTED) CONGENITAL MALFORMATIONS OF HEART AND CIRCULATORY SYSTEM: Chronic | ICD-10-CM

## 2022-11-28 DIAGNOSIS — Z98.890 OTHER SPECIFIED POSTPROCEDURAL STATES: Chronic | ICD-10-CM

## 2022-11-28 DIAGNOSIS — Z98.1 ARTHRODESIS STATUS: Chronic | ICD-10-CM

## 2022-11-28 DIAGNOSIS — Z92.89 PERSONAL HISTORY OF OTHER MEDICAL TREATMENT: Chronic | ICD-10-CM

## 2022-11-28 DIAGNOSIS — Z87.438 PERSONAL HISTORY OF OTHER DISEASES OF MALE GENITAL ORGANS: Chronic | ICD-10-CM

## 2022-11-28 DIAGNOSIS — Z96.22 MYRINGOTOMY TUBE(S) STATUS: Chronic | ICD-10-CM

## 2022-11-28 DIAGNOSIS — Z93.1 GASTROSTOMY STATUS: Chronic | ICD-10-CM

## 2022-11-28 PROCEDURE — 76770 US EXAM ABDO BACK WALL COMP: CPT | Mod: 26

## 2022-12-05 ENCOUNTER — NON-APPOINTMENT (OUTPATIENT)
Age: 22
End: 2022-12-05

## 2022-12-07 ENCOUNTER — APPOINTMENT (OUTPATIENT)
Dept: UROLOGY | Facility: CLINIC | Age: 22
End: 2022-12-07

## 2022-12-07 PROCEDURE — 99443: CPT

## 2022-12-18 NOTE — HISTORY OF PRESENT ILLNESS
[FreeTextEntry1] : Telehealth appointment today with parents\par Reviewed results from renal ultrasound\par Current ultrasound images compared to prior imaging going back to June 2021, including CT scans immediately postop: \par \par Prostatic urethral calcifications have been present since at least June 2021--- my recollection is that there were no stones or calcifications seen within the prostatic urethra on cystoscopy during the surgery from June 2021.  \par \par Bilateral renal stones are seen on the current ultrasound however none are large enough to compel surgical intervention at this time\par \par Patient to continue stone prevention regimen as directed by Dr. Negron\par Patient to do renal bladder ultrasound in June 2023 and follow-up with me at that time\par \par \par time spent as above per HPI, chart review, imaging review, treatment options discussion, risks, benefits and alternatives discussion.\par

## 2022-12-28 ENCOUNTER — NON-APPOINTMENT (OUTPATIENT)
Age: 22
End: 2022-12-28

## 2023-01-01 ENCOUNTER — NON-APPOINTMENT (OUTPATIENT)
Age: 23
End: 2023-01-01

## 2023-01-01 ENCOUNTER — APPOINTMENT (OUTPATIENT)
Dept: UROLOGY | Facility: CLINIC | Age: 23
End: 2023-01-01

## 2023-01-01 ENCOUNTER — APPOINTMENT (OUTPATIENT)
Dept: ULTRASOUND IMAGING | Facility: HOSPITAL | Age: 23
End: 2023-01-01
Payer: COMMERCIAL

## 2023-01-01 ENCOUNTER — APPOINTMENT (OUTPATIENT)
Dept: PEDIATRIC NEPHROLOGY | Facility: CLINIC | Age: 23
End: 2023-01-01
Payer: COMMERCIAL

## 2023-01-01 ENCOUNTER — APPOINTMENT (OUTPATIENT)
Dept: PEDIATRIC NEPHROLOGY | Facility: CLINIC | Age: 23
End: 2023-01-01

## 2023-01-01 ENCOUNTER — OUTPATIENT (OUTPATIENT)
Dept: OUTPATIENT SERVICES | Facility: HOSPITAL | Age: 23
LOS: 1 days | End: 2023-01-01
Payer: COMMERCIAL

## 2023-01-01 ENCOUNTER — RESULT REVIEW (OUTPATIENT)
Age: 23
End: 2023-01-01

## 2023-01-01 ENCOUNTER — APPOINTMENT (OUTPATIENT)
Dept: PEDIATRIC ALLERGY IMMUNOLOGY | Facility: CLINIC | Age: 23
End: 2023-01-01

## 2023-01-01 ENCOUNTER — APPOINTMENT (OUTPATIENT)
Dept: PEDIATRIC ALLERGY IMMUNOLOGY | Facility: CLINIC | Age: 23
End: 2023-01-01
Payer: COMMERCIAL

## 2023-01-01 ENCOUNTER — APPOINTMENT (OUTPATIENT)
Dept: ULTRASOUND IMAGING | Facility: HOSPITAL | Age: 23
End: 2023-01-01

## 2023-01-01 ENCOUNTER — OUTPATIENT (OUTPATIENT)
Dept: OUTPATIENT SERVICES | Facility: HOSPITAL | Age: 23
LOS: 1 days | End: 2023-01-01

## 2023-01-01 VITALS — TEMPERATURE: 98.24 F

## 2023-01-01 VITALS
SYSTOLIC BLOOD PRESSURE: 110 MMHG | TEMPERATURE: 97.6 F | DIASTOLIC BLOOD PRESSURE: 77 MMHG | OXYGEN SATURATION: 98 % | HEART RATE: 67 BPM

## 2023-01-01 VITALS — TEMPERATURE: 98.2 F | SYSTOLIC BLOOD PRESSURE: 106 MMHG | DIASTOLIC BLOOD PRESSURE: 71 MMHG | HEART RATE: 81 BPM

## 2023-01-01 VITALS — WEIGHT: 112 LBS

## 2023-01-01 VITALS — WEIGHT: 92.38 LBS | HEIGHT: 61.26 IN | BODY MASS INDEX: 17.22 KG/M2 | TEMPERATURE: 97.34 F

## 2023-01-01 DIAGNOSIS — Z96.22 MYRINGOTOMY TUBE(S) STATUS: Chronic | ICD-10-CM

## 2023-01-01 DIAGNOSIS — Z92.89 PERSONAL HISTORY OF OTHER MEDICAL TREATMENT: Chronic | ICD-10-CM

## 2023-01-01 DIAGNOSIS — Z87.438 PERSONAL HISTORY OF OTHER DISEASES OF MALE GENITAL ORGANS: Chronic | ICD-10-CM

## 2023-01-01 DIAGNOSIS — N20.0 CALCULUS OF KIDNEY: ICD-10-CM

## 2023-01-01 DIAGNOSIS — Z98.1 ARTHRODESIS STATUS: Chronic | ICD-10-CM

## 2023-01-01 DIAGNOSIS — Z93.1 GASTROSTOMY STATUS: Chronic | ICD-10-CM

## 2023-01-01 DIAGNOSIS — Z98.890 OTHER SPECIFIED POSTPROCEDURAL STATES: Chronic | ICD-10-CM

## 2023-01-01 DIAGNOSIS — Z87.74 PERSONAL HISTORY OF (CORRECTED) CONGENITAL MALFORMATIONS OF HEART AND CIRCULATORY SYSTEM: Chronic | ICD-10-CM

## 2023-01-01 DIAGNOSIS — D80.3 SELECTIVE DEFICIENCY OF IMMUNOGLOBULIN G [IGG] SUBCLASSES: ICD-10-CM

## 2023-01-01 DIAGNOSIS — R62.50 UNSPECIFIED LACK OF EXPECTED NORMAL PHYSIOLOGICAL DEVELOPMENT IN CHILDHOOD: ICD-10-CM

## 2023-01-01 DIAGNOSIS — D83.9 COMMON VARIABLE IMMUNODEFICIENCY, UNSPECIFIED: ICD-10-CM

## 2023-01-01 LAB
ALBUMIN SERPL ELPH-MCNC: 4.6 G/DL
ALBUMIN SERPL ELPH-MCNC: 5 G/DL
ALP BLD-CCNC: 104 U/L
ALP BLD-CCNC: 122 U/L
ALT SERPL-CCNC: 19 U/L
ALT SERPL-CCNC: 26 U/L
ANION GAP SERPL CALC-SCNC: 11 MMOL/L
ANION GAP SERPL CALC-SCNC: 13 MMOL/L
ANION GAP SERPL CALC-SCNC: 13 MMOL/L
AST SERPL-CCNC: 24 U/L
AST SERPL-CCNC: 29 U/L
BASOPHILS # BLD AUTO: 0.04 K/UL
BASOPHILS NFR BLD AUTO: 0.6 %
BILIRUB SERPL-MCNC: 0.2 MG/DL
BILIRUB SERPL-MCNC: 0.3 MG/DL
BUN SERPL-MCNC: 13 MG/DL
BUN SERPL-MCNC: 16 MG/DL
BUN SERPL-MCNC: 18 MG/DL
CALCIUM SERPL-MCNC: 10 MG/DL
CALCIUM SERPL-MCNC: 10 MG/DL
CALCIUM SERPL-MCNC: 10.3 MG/DL
CALCIUM SERPL-MCNC: 10.3 MG/DL
CHLORIDE SERPL-SCNC: 103 MMOL/L
CHLORIDE SERPL-SCNC: 103 MMOL/L
CHLORIDE SERPL-SCNC: 97 MMOL/L
CO2 SERPL-SCNC: 26 MMOL/L
CO2 SERPL-SCNC: 26 MMOL/L
CO2 SERPL-SCNC: 27 MMOL/L
CREAT SERPL-MCNC: 0.52 MG/DL
CREAT SERPL-MCNC: 0.56 MG/DL
CREAT SERPL-MCNC: 0.64 MG/DL
EGFR: 138 ML/MIN/1.73M2
EGFR: 145 ML/MIN/1.73M2
EGFR: 148 ML/MIN/1.73M2
EOSINOPHIL # BLD AUTO: 0.36 K/UL
EOSINOPHIL NFR BLD AUTO: 5.7 %
GLUCOSE SERPL-MCNC: 159 MG/DL
GLUCOSE SERPL-MCNC: 90 MG/DL
GLUCOSE SERPL-MCNC: 96 MG/DL
HCT VFR BLD CALC: 44.9 %
HGB BLD-MCNC: 15.3 G/DL
IMM GRANULOCYTES NFR BLD AUTO: 0.3 %
LYMPHOCYTES # BLD AUTO: 2.09 K/UL
LYMPHOCYTES NFR BLD AUTO: 33.1 %
MAN DIFF?: NORMAL
MCHC RBC-ENTMCNC: 33.4 PG
MCHC RBC-ENTMCNC: 34.1 GM/DL
MCV RBC AUTO: 98 FL
MONOCYTES # BLD AUTO: 0.61 K/UL
MONOCYTES NFR BLD AUTO: 9.7 %
NEUTROPHILS # BLD AUTO: 3.19 K/UL
NEUTROPHILS NFR BLD AUTO: 50.6 %
PARATHYROID HORMONE INTACT: 28 PG/ML
PLATELET # BLD AUTO: 320 K/UL
POTASSIUM SERPL-SCNC: 4.4 MMOL/L
POTASSIUM SERPL-SCNC: 4.7 MMOL/L
POTASSIUM SERPL-SCNC: 5 MMOL/L
PROT SERPL-MCNC: 7.3 G/DL
PROT SERPL-MCNC: 7.8 G/DL
RBC # BLD: 4.58 M/UL
RBC # FLD: 12.7 %
SODIUM SERPL-SCNC: 134 MMOL/L
SODIUM SERPL-SCNC: 142 MMOL/L
SODIUM SERPL-SCNC: 142 MMOL/L
URATE SERPL-MCNC: 4.9 MG/DL
WBC # FLD AUTO: 6.31 K/UL

## 2023-01-01 PROCEDURE — 76770 US EXAM ABDO BACK WALL COMP: CPT | Mod: 26

## 2023-01-01 PROCEDURE — 36415 COLL VENOUS BLD VENIPUNCTURE: CPT | Mod: GC

## 2023-01-01 PROCEDURE — 99213 OFFICE O/P EST LOW 20 MIN: CPT | Mod: GC

## 2023-01-01 PROCEDURE — 99215 OFFICE O/P EST HI 40 MIN: CPT | Mod: GC,25

## 2023-01-01 PROCEDURE — 99214 OFFICE O/P EST MOD 30 MIN: CPT

## 2023-01-01 RX ORDER — HYDROCHLOROTHIAZIDE 25 MG/1
25 TABLET ORAL DAILY
Qty: 15 | Refills: 5 | Status: ACTIVE | COMMUNITY
Start: 2022-11-14 | End: 1900-01-01

## 2023-01-01 RX ORDER — CHLOROTHIAZIDE 250 MG/5ML
250 SUSPENSION ORAL DAILY
Qty: 240 | Refills: 5 | Status: COMPLETED | COMMUNITY
Start: 2021-08-17 | End: 2023-01-01

## 2023-01-01 RX ORDER — IMMUNE GLOBULIN INFUSION (HUMAN) 100 MG/ML
20 INJECTION, SOLUTION INTRAVENOUS; SUBCUTANEOUS
Qty: 1 | Refills: 12 | Status: ACTIVE | COMMUNITY
Start: 2020-11-30

## 2023-01-24 NOTE — PHYSICAL EXAM
[Well Developed] : well developed [Well Nourished] : well nourished [Normal] : alert, oriented as age-appropriate, affect appropriate; no weakness, no facial asymmetry, moves all extremities normal gait- child older than 18 months [de-identified] : GTube site clean and dry

## 2023-01-27 NOTE — H&P PST ADULT - TOBACCO USE
Never smoker Unna Boot Text: An Unna boot was placed to help immobilize the limb and facilitate more rapid healing.

## 2023-05-12 NOTE — PHYSICAL EXAM
[Well Developed] : well developed [Well Nourished] : well nourished [Normal] : alert, oriented as age-appropriate, affect appropriate; no weakness, no facial asymmetry, moves all extremities normal gait- child older than 18 months [de-identified] : GTube site clean and dry

## 2023-07-20 PROBLEM — D83.9 CVID (COMMON VARIABLE IMMUNODEFICIENCY): Status: ACTIVE | Noted: 2020-11-09

## 2023-07-20 PROBLEM — D80.3 IGG1 SUBCLASS DEFICIENCY: Status: ACTIVE | Noted: 2021-03-28

## 2023-07-22 NOTE — CONSULT LETTER
[Dear  ___] : Dear  [unfilled], [Courtesy Letter:] : I had the pleasure of seeing your patient, [unfilled], in my office today. [Please see my note below.] : Please see my note below. [Consult Closing:] : Thank you very much for allowing me to participate in the care of this patient.  If you have any questions, please do not hesitate to contact me. [FreeTextEntry3] : Hank Green MD\par  for Academic Affairs, Department of Pediatrics\par Chief, Division of Allergy/Immunology\par [facility]\par \par Edi Ruiz Professor of Pediatrics, Professor of Molecular Medicine\par [employer] [facility]\par \par   [Sincerely,] : Sincerely,

## 2023-07-22 NOTE — REASON FOR VISIT
[Initial Consultation] : an initial consultation for [Immune Evaluation] : immune evaluation [FreeTextEntry2] : hypogammaglobuinemia

## 2023-07-22 NOTE — DATA REVIEWED
[FreeTextEntry1] : 7/19/23\par IgA low at 84\par IgG 949\par IgM 42\par \par 6/19/2023:\par IgA low at 88\par IgG normal at 898\par IgM normal at 39\par \par 5/23/23\par IgA 96\par IgG 1002\par IgM 41\par \par Previous trough IgG's in the 700s

## 2023-07-22 NOTE — PHYSICAL EXAM
[Alert] : alert [Normal Rate and Effort] : normal respiratory rhythm and effort [Normal Rate] : heart rate was normal  [Regular Rhythm] : with a regular rhythm [Skin Intact] : skin intact  [No Cyanosis] : no cyanosis [Normal Pupil & Iris Size/Symmetry] : normal pupil and iris size and symmetry [Conjunctival Erythema] : no conjunctival erythema [Normal TMs] : both tympanic membranes were normal [Supple] : the neck was supple [Wheezing] : no wheezing was heard [Soft] : abdomen soft [Not Tender] : non-tender [No HSM] : no hepato-splenomegaly [Normal Cervical Lymph Nodes] : cervical [No Motor Deficits] : the motor exam was normal [Normal Mood] : mood was normal [de-identified] : nonverbal, bruxism; syndromic facies [de-identified] : tympanitic. PEG site is clean/dry/intact [de-identified] : non verbal

## 2023-07-22 NOTE — HISTORY OF PRESENT ILLNESS
[de-identified] : 22 year male with a history of KAT6A syndrome complicated with seizure disorder (on Keppra), hypogammaglobulinemia (on Gammagard 22g IV every 4 weeks), recurrent kidney stones on HCTZ, specific antibody deficiency, cognitive development delay, ASD (s/p repair), s/p Nissen fundoplication, s/p PEG here for follow up. \par \par Since last visit a year ago he has been doing well. Had COVID in September and had mild course. No other interval infections. His level was checked prior to infusion last month and is 898. Level right before this month's infusion was 949.\par His weight was checked at neurologist's office last week and was 112 lbs (50.8 kg), an increase from previous 42 kg. With current dose of 22 grams Gammagard monthly, dose is 433 mg/kg/month. Kidney stones are stable as of last US. He does struggle with abdominal bloating that improves when he walks around, worse mostly after meals and if he is in his chair for a long time. \par \par Past History: Pt has been stable with resolution of spinal instability with a fusion of C2-C3, R kidney stones 6/7 were pulverized and removed via surgery. 1 was left as not visualized. He has developed many small stones in both kidneys. Pt is followed by nephrology. Pt switched to Diuril now Pt without any infections and has been vaccinated to COVID and has protective anti-spike antibodies. Pt is back in school wearing a mask and lately, without a mask.\par \par Pt had 2nd COVID-19 spike protein blood test and parents wanted to know if Jose Carlos made a robust response to the vaccine. Pt has been well at home without infections since the last visit.\par \par Patient had nephrolithotomy of R kidney for chronic nephrolithiasis on 6/18/21. Stone analysis showed calcium phosphate. Post-op, he developed pyelonephritis; he was hospitalized for 4 days and improved with IV antibiotic therapy. They report that they were able to remove 6 of 7 visualized stones in his right kidney. Tentative plan to monitor the remaining stone closely. Besides this infection that developed post-op, he has been infection free since his last visit. There are tentative plans to stop Keflex to see if he remains infection free. He is currently on Keflex antibiotic prophylaxis and following closely with Urology and Nephro. He is continuing with Gammagard 20g IV every 4 weeks. Recent Ig levels from 7/8/21 showed IgG 642, which trended down from 823 in 4/2021.\par \par Past history (3/25/2021):\par Since he was last seen, he has started Gammagard 20g IV every 4 weeks, which he is tolerating. He has also started antibiotic prophylaxis with Keflex in 1/2021. They report that he was also transitioned from Trileptal to Keppra since 1/2021. During the transition period between anti-epileptic treatments, he did have 2 seizures. He also had recent cervical spinal fusion surgery in 2/2021. Urology repeated CT abdo/pelvis which showed "6 stones in the right kidney, increased in number when compared to old CT from 2006" with tentative plan for surgical extraction. Otherwise Nuno has been doing well.\par \par (11/5/2020):\par  Pt is unchanged from last visit. review of lab work from Nuno' last clinic visit shows low IgG and IgM levels and multiple specific antibody deficiencies despite being up to date with his immunizations. Parents called for lab results, differential diagnosis, and disposition. \par \par (10/28/2020): \par Pt. follows with Yasmin Amos,  at [place]) complicated with seizure disorder (on Trileptal), cognitive development delay (motor function intact), ASD (s/p repair), s/p hernia repairs, s/p Nissen fundoplication, s/p PEG who presents for evaluation of immunodeficiency. He was referred by Dr. Mcneil. Patient was found to have low IgG level on routine lab screening for seizure disorder. \par \par Pt’s dad denies chronic history of infections, but over the last few months he has had recurrent UTIs. Starting in 8/2020, he reports having 4 UTIs and each was treated with courses of antibiotics. His urine culture grew E coli. Dad reports foul smelling urine associated with UTIs, but denies flank pain, fevers, penile discharge or hematuria. Dad reports a history of nephrolithiasis several years ago which resolved spontaneously. He has had kidney imaging and [facility] reflux study which were unremarkable. He does not have an indwelling catheter. He wears a diaper. He had no history of UTIs prior to 8/2020.\par \par Prior to the past 3 months, he usually had 0-1 infections each year. There is no history of sinopulmonary infections, pharyngitis, otitis, GI infections, cellulitis or fevers. Prior to this year he would require 1-2 courses of antibiotics, usually for a respiratory infection, but dad feels there may have been an abundance of caution given patient’s comorbid conditions. There is no history of bacteremia or pneumonia. He denies any family history of immunodeficiency, miscarriages or early infant deaths. He reports being up to date with all recommended age-appropriate immunizations.\par \par The patient denies any history of anaphylaxis, angioedema, asthma/respiratory manifestations, recurrent hives/pruritic skin. There is no history of environmental allergies.

## 2023-11-14 PROBLEM — N20.0 KIDNEY STONE: Status: ACTIVE | Noted: 2020-11-20

## 2024-05-21 ENCOUNTER — APPOINTMENT (OUTPATIENT)
Dept: PEDIATRIC NEPHROLOGY | Facility: CLINIC | Age: 24
End: 2024-05-21

## 2024-05-21 ENCOUNTER — APPOINTMENT (OUTPATIENT)
Dept: ULTRASOUND IMAGING | Facility: HOSPITAL | Age: 24
End: 2024-05-21

## 2024-10-18 NOTE — H&P PST ADULT - NSANTHTIREDRD_ENT_A_CORE
AMBULATORY CASE MANAGEMENT NOTE    Names and Relationships of Patient/Support Persons: Contact: Malcolm Barraza; Relationship: Self -     Patient Outreach    RN-ACM outreach to patient for M[facility]P and HRCM engagement. Patient was traveling at time of call, ACM offered to call next week, pt agreeable. Outreach scheduled.         Joyce JEFFERSON  Ambulatory Case Management    10/18/2024, 12:37 EDT  
No